# Patient Record
Sex: FEMALE | Race: WHITE | NOT HISPANIC OR LATINO | Employment: OTHER | URBAN - METROPOLITAN AREA
[De-identification: names, ages, dates, MRNs, and addresses within clinical notes are randomized per-mention and may not be internally consistent; named-entity substitution may affect disease eponyms.]

---

## 2023-12-13 ENCOUNTER — HOSPITAL ENCOUNTER (EMERGENCY)
Facility: HOSPITAL | Age: 68
Discharge: HOME | End: 2023-12-13
Attending: STUDENT IN AN ORGANIZED HEALTH CARE EDUCATION/TRAINING PROGRAM
Payer: MEDICARE

## 2023-12-13 VITALS
HEART RATE: 78 BPM | OXYGEN SATURATION: 95 % | DIASTOLIC BLOOD PRESSURE: 59 MMHG | WEIGHT: 263.88 LBS | SYSTOLIC BLOOD PRESSURE: 120 MMHG | RESPIRATION RATE: 20 BRPM | TEMPERATURE: 97 F

## 2023-12-13 DIAGNOSIS — R05.9 COUGH: ICD-10-CM

## 2023-12-13 DIAGNOSIS — R05.3 CHRONIC COUGH: Primary | ICD-10-CM

## 2023-12-13 LAB
INFLUENZA A, MOLECULAR: NEGATIVE
INFLUENZA B, MOLECULAR: NEGATIVE
RSV AG SPEC QL IA: NEGATIVE
SARS-COV-2 RDRP RESP QL NAA+PROBE: NEGATIVE
SPECIMEN SOURCE: NORMAL
SPECIMEN SOURCE: NORMAL

## 2023-12-13 PROCEDURE — 87634 RSV DNA/RNA AMP PROBE: CPT

## 2023-12-13 PROCEDURE — 87502 INFLUENZA DNA AMP PROBE: CPT

## 2023-12-13 PROCEDURE — 99283 EMERGENCY DEPT VISIT LOW MDM: CPT | Mod: 25

## 2023-12-13 PROCEDURE — U0002 COVID-19 LAB TEST NON-CDC: HCPCS

## 2023-12-13 NOTE — ED PROVIDER NOTES
Encounter Date: 12/13/2023       History     Chief Complaint   Patient presents with    Cough     This note is dictated on M*Modal word recognition program.  There are word recognition mistakes and grammatical errors that are occasionally missed on review.     Agueda Wallace is a 68 y.o. female presents to ER today with complaints of chronic cough for over the last 6 months she reports nothing she is doing his helping the cough become better.  Patient does reports she uses inhalers for allergies.  I also found in patient's records that she was taking lisinopril 10 mg daily.  Patient denies any chest pain or shortness of breath currently vital signs hemodynamically stable in triage.    The history is provided by the patient.     Review of patient's allergies indicates:  No Known Allergies  No past medical history on file.  No past surgical history on file.  No family history on file.     Review of Systems   Constitutional: Negative.    HENT: Negative.     Eyes: Negative.    Respiratory:  Positive for cough.    Cardiovascular: Negative.    Gastrointestinal: Negative.    Endocrine: Negative.    Genitourinary: Negative.    Musculoskeletal: Negative.    Skin: Negative.    Allergic/Immunologic: Negative.    Neurological: Negative.    Hematological: Negative.    Psychiatric/Behavioral: Negative.         Physical Exam     Initial Vitals [12/13/23 1436]   BP Pulse Resp Temp SpO2   (!) 120/59 78 20 96.7 °F (35.9 °C) 95 %      MAP       --         Physical Exam    Constitutional: She appears well-developed and well-nourished. She is not diaphoretic. No distress.   HENT:   Right Ear: External ear normal.   Left Ear: External ear normal.   Eyes: Pupils are equal, round, and reactive to light. Right eye exhibits no discharge. Left eye exhibits no discharge. No scleral icterus.   Neck: Neck supple.   Normal range of motion.  Cardiovascular:  Normal rate, regular rhythm, normal heart sounds and intact distal pulses.     Exam  reveals no friction rub.       No murmur heard.  Pulmonary/Chest: Breath sounds normal. No respiratory distress. She has no wheezes. She has no rhonchi. She has no rales. She exhibits no tenderness.   Abdominal: Abdomen is soft. She exhibits no distension and no mass. There is no abdominal tenderness. There is no rebound and no guarding.   Musculoskeletal:         General: No tenderness or edema.      Cervical back: Normal range of motion and neck supple.     Neurological: She is alert and oriented to person, place, and time. GCS score is 15. GCS eye subscore is 4. GCS verbal subscore is 5. GCS motor subscore is 6.   Skin: Capillary refill takes less than 2 seconds. No rash and no abscess noted. No erythema. No pallor.   Psychiatric: She has a normal mood and affect. Her behavior is normal. Judgment and thought content normal.         ED Course   Procedures  Labs Reviewed   INFLUENZA A & B BY MOLECULAR   RSV ANTIGEN DETECTION   SARS-COV-2 RNA AMPLIFICATION, QUAL          Imaging Results              X-Ray Chest PA And Lateral (Final result)  Result time 12/13/23 15:28:53      Final result by Colin Andrade MD (12/13/23 15:28:53)                   Narrative:    EXAMINATION:  XR CHEST PA AND LATERAL    CLINICAL HISTORY:  Cough, unspecified    TECHNIQUE:  PA and lateral views of the chest were performed.    COMPARISON:  None    FINDINGS:  Lungs are clear.  Normal size heart.  Atherosclerosis.  No pleural effusion or pneumothorax.  Mild multilevel degenerative changes in the spine.      Electronically signed by: Colin Andrade  Date:    12/13/2023  Time:    15:28                                     Medications - No data to display  Medical Decision Making  Differential diagnosis include GERD, medication side effect, pneumonia, viral syndrome, cough    Patient's RSV, influenza, COVID test all negative today.  Chest x-ray reveals no acute cardiopulmonary abnormality.  I believe patient has chronic cough is due to  possible side effect of lisinopril.  I have instructed patient to follow-up with PCP for a possible blood pressure medication adjustment.  Patient stable at time of discharge in no acute distress.  No life-threatening illnesses were found during ER visit today.  Patient was instructed to follow-up with PCP or other recommended specialist within the next 48-72 hours.  Patient was instructed to return to ER immediately for any worsening or concerning symptoms.  All discharge instructions discussed with patient, and patient agrees to comply with discharge instructions given today.     Amount and/or Complexity of Data Reviewed  Radiology: ordered.                                      Clinical Impression:  Final diagnoses:  [R05.9] Cough  [R05.3] Chronic cough (Primary)          ED Disposition Condition    Discharge Stable          ED Prescriptions    None       Follow-up Information    None          Rowdy Sweet NP  12/13/23 [id number]

## 2023-12-13 NOTE — ED TRIAGE NOTES
68 y.o. female presents to ER Room/bed info not found   Chief Complaint   Patient presents with    Cough   .   C/o cough for six months and nothing is helping

## 2023-12-13 NOTE — DISCHARGE INSTRUCTIONS
Please follow-up with your PCP.  The lisinopril your taking may be cause you to have this chronic cough you are dealing with.  Your PCP may want to switch your blood pressure medication.  Your COVID-19 and influenza swab were negative today. Chest   x-ray was within normal limits today.